# Patient Record
(demographics unavailable — no encounter records)

---

## 2025-03-27 NOTE — ASSESSMENT
[FreeTextEntry1] : #Sleep disordered breathing Patient referred to pulmonary medicine by psychiatrist for abnormal sleep pattern Suspect restless leg syndrome or parasomnias, will favor obtaining in lab diagnostic polysomnogram  FU after sleep study

## 2025-03-27 NOTE — HISTORY OF PRESENT ILLNESS
[Never] : never [Current] : current [Awakes Unrefreshed] : awakes unrefreshed [TextBox_27] : occasionally [TextBox_4] : Patient goes by the name: Amina  29yF sent by psychiatry for sleep study. Psychiatrist started Effexor and Vyvanse. She started taking Effexor last yr She snores per partner, stops breathing and jerks a lot in sleep, never feels refreshed from sleep If does not take stimulant, she takes a nap in the afternoon. She drinks 2 shots of espresso a day, no soda No weight change No sleep walking, talking, acting out Moves legs a lot in bed No paralysis   Family hx: parents snore [TextBox_77] : 11p [TextBox_79] : 7-8am [TextBox_89] : not often, sleeps through the night

## 2025-03-27 NOTE — PHYSICAL EXAM
[No Acute Distress] : no acute distress [Normal Oropharynx] : normal oropharynx [Retrognathia] : retrognathia [II] : Mallampati Class: II [Normal Appearance] : normal appearance [No Neck Mass] : no neck mass [Normal Rate/Rhythm] : normal rate/rhythm [Normal S1, S2] : normal s1, s2 [No Murmurs] : no murmurs [No Resp Distress] : no resp distress [Clear to Auscultation Bilaterally] : clear to auscultation bilaterally [No Abnormalities] : no abnormalities [Benign] : benign [Normal Gait] : normal gait [No Clubbing] : no clubbing [No Cyanosis] : no cyanosis [No Edema] : no edema [FROM] : FROM [Normal Color/ Pigmentation] : normal color/ pigmentation [No Focal Deficits] : no focal deficits [Oriented x3] : oriented x3 [Normal Affect] : normal affect

## 2025-03-27 NOTE — HISTORY OF PRESENT ILLNESS
[Never] : never [Current] : current [Awakes Unrefreshed] : awakes unrefreshed [TextBox_4] : Patient goes by the name: Amina  29yF sent by psychiatry for sleep study. Psychiatrist started Effexor and Vyvanse. She started taking Effexor last yr She snores per partner, stops breathing and jerks a lot in sleep, never feels refreshed from sleep If does not take stimulant, she takes a nap in the afternoon. She drinks 2 shots of espresso a day, no soda No weight change No sleep walking, talking, acting out Moves legs a lot in bed No paralysis   Family hx: parents snore [TextBox_27] : occasionally [TextBox_77] : 11p [TextBox_79] : 7-8am [TextBox_89] : not often, sleeps through the night

## 2025-05-09 NOTE — ASSESSMENT
[FreeTextEntry1] : #Sleep disordered breathing, mild JILLIAN Patient would like to get more info about oral appliance dental consult placed and details for office provided Prescribed Auto PAP therapy No parasomnias noted on diagnostic polysomnogram  FU4-6 weeks

## 2025-05-09 NOTE — HISTORY OF PRESENT ILLNESS
[Never] : never [Current] : current [Awakes Unrefreshed] : awakes unrefreshed [TextBox_4] : Patient goes by the name: Amina 29yF sent by psychiatry for sleep study. Psychiatrist started Effexor and Vyvanse. She started taking Effexor last yr She snores per partner, stops breathing and jerks a lot in sleep, never feels refreshed from sleep If does not take stimulant, she takes a nap in the afternoon. She drinks 2 shots of espresso a day, no soda No weight change No sleepwalking, talking, acting out Moves legs a lot in bed No paralysis  Family hx: parents snore  5/6/2025 No new symptoms, continues to snore at night and is fatigued during the daytime Discussed sleep study results [TextBox_27] : occasionally [TextBox_77] : 11p [TextBox_79] : 7-8am [TextBox_89] : not often, sleeps through the night